# Patient Record
Sex: FEMALE | Race: WHITE | Employment: FULL TIME | URBAN - NONMETROPOLITAN AREA
[De-identification: names, ages, dates, MRNs, and addresses within clinical notes are randomized per-mention and may not be internally consistent; named-entity substitution may affect disease eponyms.]

---

## 2022-02-14 ENCOUNTER — TELEPHONE (OUTPATIENT)
Dept: NEPHROLOGY | Age: 22
End: 2022-02-14

## 2022-02-14 DIAGNOSIS — N17.9 AKI (ACUTE KIDNEY INJURY) (HCC): Primary | ICD-10-CM

## 2022-02-14 NOTE — TELEPHONE ENCOUNTER
Pt called the office stating she needs a new pt appt to see you. She was in [facility] and you were consulted in, but do not have privilages there. It was for CICI. She wants seen in [facility], but your first available is in April. Do you need to see the pt sooner than that?

## 2022-02-14 NOTE — TELEPHONE ENCOUNTER
Pt called back. She is set up on 4/11/22 in [place]. Lab slip mailed to pt. She is aware to get labs done prior to appt. She understood.

## 2022-02-14 NOTE — TELEPHONE ENCOUNTER
Labs from today look a lot better. April appointment is ok. Get a repeat bmp as well as microscopic UA about 1 week prior.   thanks

## 2022-03-31 PROBLEM — N17.9 ACUTE KIDNEY INJURY (HCC): Status: ACTIVE | Noted: 2022-02-06

## 2022-03-31 PROBLEM — M54.2 NECK PAIN: Status: ACTIVE | Noted: 2021-03-09

## 2022-03-31 PROBLEM — M54.81 OCCIPITAL NEURALGIA: Status: ACTIVE | Noted: 2021-03-09

## 2022-03-31 PROBLEM — E66.01 OBESITY, MORBID, BMI 40.0-49.9 (HCC): Status: ACTIVE | Noted: 2020-12-30

## 2022-03-31 PROBLEM — G43.909 MIGRAINE: Status: ACTIVE | Noted: 2021-03-09

## 2022-03-31 RX ORDER — HYDROXYZINE HYDROCHLORIDE 25 MG/1
25-50 TABLET, FILM COATED ORAL
COMMUNITY
Start: 2021-10-21

## 2022-03-31 RX ORDER — TIZANIDINE 4 MG/1
4 TABLET ORAL 3 TIMES DAILY
COMMUNITY
Start: 2022-02-01

## 2022-03-31 RX ORDER — ACETAMINOPHEN 325 MG/1
650 TABLET ORAL EVERY 6 HOURS PRN
COMMUNITY
Start: 2022-02-09

## 2022-03-31 RX ORDER — ESCITALOPRAM OXALATE 10 MG/1
10 TABLET ORAL DAILY
COMMUNITY
Start: 2021-10-21

## 2022-04-11 ENCOUNTER — OFFICE VISIT (OUTPATIENT)
Dept: NEPHROLOGY | Age: 22
End: 2022-04-11
Payer: COMMERCIAL

## 2022-04-11 VITALS
HEIGHT: 62 IN | DIASTOLIC BLOOD PRESSURE: 86 MMHG | BODY MASS INDEX: 47.84 KG/M2 | OXYGEN SATURATION: 97 % | HEART RATE: 99 BPM | WEIGHT: 260 LBS | SYSTOLIC BLOOD PRESSURE: 130 MMHG

## 2022-04-11 DIAGNOSIS — N17.9 AKI (ACUTE KIDNEY INJURY) (HCC): Primary | ICD-10-CM

## 2022-04-11 PROCEDURE — 99204 OFFICE O/P NEW MOD 45 MIN: CPT | Performed by: INTERNAL MEDICINE

## 2022-04-11 NOTE — PROGRESS NOTES
[facility]  [address] [address]  Dept: [phone]  Loc: [phone]  Outpatient Consult  [phone]  4/11/2022 10:07 AM EDT        Pt Name:    Devin Boggs  YOB: 2000  Primary Care Physician:  CADEN Kendall     Chief Complaint:   Chief Complaint   Patient presents with    New Patient     Hospitalist CICI        Background Information/Interval History:   The patient is a pleasant 24y.o. year old female seen for CICI. She presented to [facility] in Feb with abdominal pain, nausea, and flank pain. She was found to have CICI creatinine was 2.8 on admission which improved with IV fluids and she was also given Solumedrol IVP . She had recently been treated as an outpatient for a migraine and received Toradol and Ubrelvy. Most recent serum creatinine 0.5 and has been normal since she got out of the hospital.  Microscopic UA at [facility] showed 6-8 RBCs. Upc was minimal.  Repeat UA was negative 0-2 RBCs. She has elevated CRP and ESR. CT A/P and renal US were unremarkable. She has chronic low back pain and neck pain. Recent MRI brain showed nonspecific few punctate foci in white matter frontal lobes, possibly from chronic migraines, demyelinating disease or vasculitis. She has congenital nystagmus. She continues to have elevated ESR and CRP. MACKENZIE and ANCA were negative. She recently saw her eye doctor and reports everything looked ok. Allergies:  Patient has no known allergies. Past Medical History:  No past medical history on file. Past Surgical History:  No past surgical history on file. Family History:  No family history on file.      Social History:  Social History     Socioeconomic History    Marital status:      Spouse name: Not on file    Number of children: Not on file    Years of education: Not on file    Highest education level: Not on file   Occupational History    Not on file   Tobacco Use    Smoking status: Not on file    Smokeless tobacco: Not on file   Substance and Sexual Activity    Alcohol use: Not on file    Drug use: Not on file    Sexual activity: Not on file   Other Topics Concern    Not on file   Social History Narrative    Not on file     Social Determinants of Health     Financial Resource Strain:     Difficulty of Paying Living Expenses: Not on file   Food Insecurity:     Worried About Running Out of Food in the Last Year: Not on file    [place] of Food in the Last Year: Not on file   Transportation Needs:     Lack of Transportation (Medical): Not on file    Lack of Transportation (Non-Medical):  Not on file   Physical Activity:     Days of Exercise per Week: Not on file    Minutes of Exercise per Session: Not on file   Stress:     Feeling of Stress : Not on file   Social Connections:     Frequency of Communication with Friends and Family: Not on file    Frequency of Social Gatherings with Friends and Family: Not on file    Attends Anabaptist Services: Not on file    Active Member of [address] [place] [address] or Organizations: Not on file    Attends Club or Organization Meetings: Not on file    Marital Status: Not on file   Intimate Partner Violence:     Fear of Current or Ex-Partner: Not on file    Emotionally Abused: Not on file    Physically Abused: Not on file    Sexually Abused: Not on file   Housing Stability:     Unable to Pay for Housing in the Last Year: Not on file    Number of [place] in the Last Year: Not on file    Unstable Housing in the Last Year: Not on file        Review of Systems:  Constitutional: no fever or chills  Head: No headaches  Eyes: nystagmus  Ears: no ear pain or hearing changes  Nose: no runny nose or epistaxis  Respiratory: no shortness of breath or cough or sputum production  Cardiovascular: no chest pain, no edema  GI: no nausea, no vomiting or diarrhea  : denies any hematuria, no flank pain  Skin: no rash  Musculoskeletal: +back pain + neck pain  Neuro: no tremor, no slurred speech  Psychiatric: stable mood, no depression or insomnia     Home Medications:  Prior to Admission medications    Medication Sig Start Date End Date Taking? Authorizing Provider   acetaminophen (TYLENOL) 325 MG tablet Take 650 mg by mouth every 6 hours as needed 2/9/22  Yes Historical Provider, MD   escitalopram (LEXAPRO) 10 MG tablet Take 10 mg by mouth daily  Patient not taking: Reported on 4/11/2022 10/21/21   Historical Provider, MD   hydrOXYzine (ATARAX) 25 MG tablet Take 25-50 mg by mouth  Patient not taking: Reported on 4/11/2022 10/21/21   Historical Provider, MD   tiZANidine (ZANAFLEX) 4 MG tablet Take 4 mg by mouth 3 times daily  Patient not taking: Reported on 4/11/2022 2/1/22   Historical Provider, MD        Physical Examination:  VITALS:  /86 (Site: Left Upper Arm, Position: Sitting, Cuff Size: Large Adult)   Pulse 99   Ht 5' 2\" (1.575 m)   Wt 260 lb (117.9 kg)   SpO2 97%   BMI 47.55 kg/m²   Body mass index is 47.55 kg/m². Wt Readings from Last 3 Encounters:   04/11/22 260 lb (117.9 kg)     Constitutional and General Appearance: alert and cooperative with exam, appears comfortable, no distress  Eyes: no icteric sclera, no pallor conjunctiva, no discharge seen from either eye  Ears and Nose: normal external appearance of left and right ear and nose. No active drainage from nose.    Oral: moist oral mucus membranes  Neck: No jugular venous distention, appears symmetric, good ROM  Lungs: Air entry B/L, no crackles or rales, no use of accessory muscles  Heart: regular rate, S1, S2  Extremities: * LE edema * HD access  GI: soft, non-tender, no guarding  Skin: no rash seen on exposed extremities  Musculo: moves all extremities  Neuro: no slurred speech, no facial drooping, no asterixis  Psychiatric: Awake, alert, Oriented     Laboratory & Diagnostics:  CBC: No results found for: WBC, HGB, HCT, MCV, PLT  BMP:  No results found for: NA, K, CL, CO2, BUN, CREATININE, GLUCOSE   Hepatic: No results found for: AST, ALT, ALB, BILITOT, ALKPHOS  BNP: No results found for: BNP  Lipids: No results found for: CHOL, HDL  INR: No results found for: INR  URINE: No results found for: NAUR, PROTUR  No results found for: NITRU, COLORU, PHUR, LABCAST, WBCUA, RBCUA, MUCUS, TRICHOMONAS, YEAST, BACTERIA, CLARITYU, SPECGRAV, LEUKOCYTESUR, UROBILINOGEN, BILIRUBINUR, BLOODU, GLUCOSEU, KETUA, AMORPHOUS   Microalbumen/Creatinine ratio:  No components found for: RUCREAT        Impression/Plan:   1. 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 Recent microscopic UA was negative for hematuria (0-2 RBCs). -she does have some overall concerning systemic systemic symptoms however and she is seeing rheumatology in June (Dr. Ayesha Hook) and I also advised her to f/u with her neurologist (Dr. Carlos Eduardo Reynolds) for abnormal MRI  -will see her back in 3 months and repeat BMP and urine studies , if she does have any recurrence of microscopic hematuria will consider renal biopsy      Return in 3 months or sooner if the need arises  Thought process was discussed with the patient.   Thank you for the referral.  Please do not hesitate to contact me if you have any questions or concerns        Rodney Ureña, DO  [facility]

## 2022-07-11 ENCOUNTER — OFFICE VISIT (OUTPATIENT)
Dept: NEPHROLOGY | Age: 22
End: 2022-07-11
Payer: COMMERCIAL

## 2022-07-11 VITALS
HEART RATE: 90 BPM | WEIGHT: 260 LBS | OXYGEN SATURATION: 98 % | DIASTOLIC BLOOD PRESSURE: 83 MMHG | BODY MASS INDEX: 47.55 KG/M2 | SYSTOLIC BLOOD PRESSURE: 137 MMHG

## 2022-07-11 DIAGNOSIS — N17.9 AKI (ACUTE KIDNEY INJURY) (HCC): Primary | ICD-10-CM

## 2022-07-11 PROCEDURE — 99213 OFFICE O/P EST LOW 20 MIN: CPT | Performed by: INTERNAL MEDICINE

## 2022-07-11 NOTE — PROGRESS NOTES
[facility]  [address] [facility] [address]  Dept: [phone]  Loc: [phone]  Progress Note  7/11/2022 10:10 AM      Pt Name:    Michelle Mirza  YOB: 2000  Primary Care Physician:  ZBIGNIEW Bauer, PA     Chief Complaint:   Chief Complaint   Patient presents with    Follow-up     CICI         History of Present Illness: This is a follow-up visit for hx of CICI. She initially presented to [facility] in Feb 2022 with abdominal pain, nausea, and flank pain. She was found to have CICI creatinine was 2.8 on admission which improved with IV fluids and she was also given Solumedrol IVP . Microscopic UA at [facility] showed 6-8 RBCs. Upc was minimal.  She has elevated CRP and ESR. CT A/P and renal US were unremarkable.      She has chronic low back pain and neck pain as well as chronic fatigue. She has congenital nystagmus. She continues to have elevated ESR and CRP. MACKENZIE and ANCA were negative. She saw rheumatology did not get any answers so she got a referral to [facility]. Also states neurology wanted to do an LP to r/u MS but she is holding off. Pertinent items are noted in HPI. Past History:  No past medical history on file. No past surgical history on file. VITALS:  /83 (Site: Right Upper Arm, Position: Sitting, Cuff Size: Large Adult)   Pulse 90   Wt 260 lb (117.9 kg)   SpO2 98%   BMI 47.55 kg/m²   Wt Readings from Last 3 Encounters:   07/11/22 260 lb (117.9 kg)   04/11/22 260 lb (117.9 kg)     Body mass index is 47.55 kg/m².      General Appearance: alert and cooperative with exam, appears comfortable, no distress  HEENT: EOMI, moist oral mucus membranes  Neck: No jugular venous distention  Lungs: Air entry B/L, no crackles or rales, no use of accessory muscles  Heart: S1, S2 heard, no rub  GI: soft, non-tender, no guarding  Extremities: No sig LE edema, no cyanosis  Skin: warm, dry  Neurologic: no tremor, no asterixis,     Medications:  Current Outpatient Medications   Medication Sig Dispense Refill    sertraline (ZOLOFT) 50 MG tablet TAKE 1 TABLET BY MOUTH ONCE DAILY      acetaminophen (TYLENOL) 325 MG tablet Take 650 mg by mouth every 6 hours as needed      escitalopram (LEXAPRO) 10 MG tablet Take 10 mg by mouth daily (Patient not taking: Reported on 4/11/2022)      hydrOXYzine (ATARAX) 25 MG tablet Take 25-50 mg by mouth (Patient not taking: Reported on 4/11/2022)      tiZANidine (ZANAFLEX) 4 MG tablet Take 4 mg by mouth 3 times daily (Patient not taking: Reported on 4/11/2022)       No current facility-administered medications for this visit. Laboratory & Diagnostics:  CBC: No results found for: WBC, HGB, HCT, MCV, PLT  BMP:  No results found for: NA, K, CL, CO2, BUN, CREATININE, GLUCOSE   Hepatic: No results found for: AST, ALT, ALB, BILITOT, ALKPHOS  BNP: No results found for: BNP  Lipids: No results found for: CHOL, HDL  INR: No results found for: INR  URINE: No results found for: NAUR, PROTUR  No results found for: NITRU, COLORU, PHUR, LABCAST, WBCUA, RBCUA, MUCUS, TRICHOMONAS, YEAST, BACTERIA, CLARITYU, SPECGRAV, LEUKOCYTESUR, UROBILINOGEN, BILIRUBINUR, BLOODU, GLUCOSEU, KETUA, AMORPHOUS   Microalbumen/Creatinine ratio:  No components found for: RUCREAT        Impression/Plan:   1. 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 Chronic fatigue  3. Chronic back pain    Following with other multiple specialists as she hasn't gotten a diagnosis yet.         Bloodwork and medications were reviewed and plan of care discussed